# Patient Record
Sex: MALE
[De-identification: names, ages, dates, MRNs, and addresses within clinical notes are randomized per-mention and may not be internally consistent; named-entity substitution may affect disease eponyms.]

---

## 2021-01-22 ENCOUNTER — HOSPITAL ENCOUNTER (OUTPATIENT)
Dept: HOSPITAL 72 - RAD | Age: 36
Discharge: HOME | End: 2021-01-22
Payer: COMMERCIAL

## 2021-01-22 DIAGNOSIS — R10.9: Primary | ICD-10-CM

## 2021-01-22 PROCEDURE — 76700 US EXAM ABDOM COMPLETE: CPT

## 2021-01-22 NOTE — DIAGNOSTIC IMAGING REPORT
Indication: Abdominal pain. Rule out gallstones.

 

Technique: Multiplanar grayscale and duplex Doppler imaging of the abdomen

 

Comparison: None

 

Findings: Imaged portions of the abdominal aorta are normal in caliber.

 

Imaged portions of the pancreatic head unremarkable in appearance.

 

Hepatic contour is smooth. No focal hepatic mass lesion is appreciated

sonographically. Imaged hepatic veins are patent. The main portal vein is patent with

normal direction of flow. No gallstones or gallbladder sludge identified. No

gallbladder wall thickening or pericholecystic fluid is seen. Sonographic Escobar's

sign reported as negative. No intrahepatic or extrahepatic biliary ductal dilatation.

Common bile duct measures 2 mm diameter.

 

Kidneys demonstrate normal echogenicity and are symmetric in size. There is no

hydronephrosis or sonographically appreciable renal stone. No contour deforming renal

masses appreciated sonographically.

 

Spleen is unremarkable in size and appearance.

 

No free fluid is demonstrated.

 

IMPRESSION: 

Unremarkable abdominal sonogram.

 

No evidence of cholelithiasis or gallbladder sludge as questioned clinically.